# Patient Record
Sex: FEMALE | Race: BLACK OR AFRICAN AMERICAN | NOT HISPANIC OR LATINO | URBAN - METROPOLITAN AREA
[De-identification: names, ages, dates, MRNs, and addresses within clinical notes are randomized per-mention and may not be internally consistent; named-entity substitution may affect disease eponyms.]

---

## 2017-12-31 ENCOUNTER — INPATIENT (INPATIENT)
Facility: HOSPITAL | Age: 32
LOS: 1 days | Discharge: HOME | End: 2018-01-02
Attending: INTERNAL MEDICINE | Admitting: INTERNAL MEDICINE
Payer: MEDICAID

## 2017-12-31 VITALS
HEIGHT: 66 IN | HEART RATE: 120 BPM | SYSTOLIC BLOOD PRESSURE: 150 MMHG | WEIGHT: 293 LBS | DIASTOLIC BLOOD PRESSURE: 79 MMHG | TEMPERATURE: 98 F | RESPIRATION RATE: 99 BRPM

## 2017-12-31 DIAGNOSIS — E28.2 POLYCYSTIC OVARIAN SYNDROME: ICD-10-CM

## 2017-12-31 DIAGNOSIS — N93.9 ABNORMAL UTERINE AND VAGINAL BLEEDING, UNSPECIFIED: ICD-10-CM

## 2017-12-31 DIAGNOSIS — D50.9 IRON DEFICIENCY ANEMIA, UNSPECIFIED: ICD-10-CM

## 2017-12-31 LAB
ABO RH CONFIRMATION: SIGNIFICANT CHANGE UP
ALBUMIN SERPL ELPH-MCNC: 2.8 G/DL — LOW (ref 3.3–5)
ALP SERPL-CCNC: 50 U/L — SIGNIFICANT CHANGE UP (ref 40–120)
ALT FLD-CCNC: 56 U/L — SIGNIFICANT CHANGE UP (ref 12–78)
ANION GAP SERPL CALC-SCNC: 7 MMOL/L — SIGNIFICANT CHANGE UP (ref 5–17)
ANISOCYTOSIS BLD QL: SLIGHT — SIGNIFICANT CHANGE UP
APTT BLD: 26.6 SEC — LOW (ref 27.5–37.4)
AST SERPL-CCNC: 21 U/L — SIGNIFICANT CHANGE UP (ref 15–37)
BILIRUB SERPL-MCNC: 0.1 MG/DL — LOW (ref 0.2–1.2)
BLD GP AB SCN SERPL QL: SIGNIFICANT CHANGE UP
BUN SERPL-MCNC: 8 MG/DL — SIGNIFICANT CHANGE UP (ref 7–23)
CALCIUM SERPL-MCNC: 8.1 MG/DL — LOW (ref 8.5–10.1)
CHLORIDE SERPL-SCNC: 107 MMOL/L — SIGNIFICANT CHANGE UP (ref 96–108)
CO2 SERPL-SCNC: 26 MMOL/L — SIGNIFICANT CHANGE UP (ref 22–31)
CREAT SERPL-MCNC: 0.65 MG/DL — SIGNIFICANT CHANGE UP (ref 0.5–1.3)
EOSINOPHIL NFR BLD AUTO: 3 % — SIGNIFICANT CHANGE UP (ref 0–6)
GLUCOSE SERPL-MCNC: 89 MG/DL — SIGNIFICANT CHANGE UP (ref 70–99)
HCG SERPL-ACNC: <1 MIU/ML — SIGNIFICANT CHANGE UP
HCT VFR BLD CALC: 17.5 % — CRITICAL LOW (ref 34.5–45)
HCT VFR BLD CALC: 23.2 % — LOW (ref 34.5–45)
HGB BLD-MCNC: 5.8 G/DL — CRITICAL LOW (ref 11.5–15.5)
HGB BLD-MCNC: 7.8 G/DL — LOW (ref 11.5–15.5)
HYPOCHROMIA BLD QL: SIGNIFICANT CHANGE UP
INR BLD: 0.97 RATIO — SIGNIFICANT CHANGE UP (ref 0.88–1.16)
LYMPHOCYTES # BLD AUTO: 22 % — SIGNIFICANT CHANGE UP (ref 13–44)
MAGNESIUM SERPL-MCNC: 2.1 MG/DL — SIGNIFICANT CHANGE UP (ref 1.6–2.6)
MCHC RBC-ENTMCNC: 28.1 PG — SIGNIFICANT CHANGE UP (ref 27–34)
MCHC RBC-ENTMCNC: 28.5 PG — SIGNIFICANT CHANGE UP (ref 27–34)
MCHC RBC-ENTMCNC: 33.2 GM/DL — SIGNIFICANT CHANGE UP (ref 32–36)
MCHC RBC-ENTMCNC: 33.8 GM/DL — SIGNIFICANT CHANGE UP (ref 32–36)
MCV RBC AUTO: 84.2 FL — SIGNIFICANT CHANGE UP (ref 80–100)
MCV RBC AUTO: 84.5 FL — SIGNIFICANT CHANGE UP (ref 80–100)
MICROCYTES BLD QL: SIGNIFICANT CHANGE UP
MONOCYTES NFR BLD AUTO: 6 % — SIGNIFICANT CHANGE UP (ref 2–14)
NEUTROPHILS NFR BLD AUTO: 69 % — SIGNIFICANT CHANGE UP (ref 43–77)
PLAT MORPH BLD: NORMAL — SIGNIFICANT CHANGE UP
PLATELET # BLD AUTO: 353 K/UL — SIGNIFICANT CHANGE UP (ref 150–400)
PLATELET # BLD AUTO: 387 K/UL — SIGNIFICANT CHANGE UP (ref 150–400)
POLYCHROMASIA BLD QL SMEAR: SLIGHT — SIGNIFICANT CHANGE UP
POTASSIUM SERPL-MCNC: 4.1 MMOL/L — SIGNIFICANT CHANGE UP (ref 3.5–5.3)
POTASSIUM SERPL-SCNC: 4.1 MMOL/L — SIGNIFICANT CHANGE UP (ref 3.5–5.3)
PROT SERPL-MCNC: 7 GM/DL — SIGNIFICANT CHANGE UP (ref 6–8.3)
PROTHROM AB SERPL-ACNC: 10.6 SEC — SIGNIFICANT CHANGE UP (ref 9.8–12.7)
RBC # BLD: 2.07 M/UL — LOW (ref 3.8–5.2)
RBC # BLD: 2.76 M/UL — LOW (ref 3.8–5.2)
RBC # FLD: 16 % — HIGH (ref 11–15)
RBC # FLD: 19.1 % — HIGH (ref 11–15)
RBC BLD AUTO: ABNORMAL
SODIUM SERPL-SCNC: 140 MMOL/L — SIGNIFICANT CHANGE UP (ref 135–145)
WBC # BLD: 12.2 K/UL — HIGH (ref 3.8–10.5)
WBC # BLD: 15 K/UL — HIGH (ref 3.8–10.5)
WBC # FLD AUTO: 12.2 K/UL — HIGH (ref 3.8–10.5)
WBC # FLD AUTO: 15 K/UL — HIGH (ref 3.8–10.5)

## 2017-12-31 PROCEDURE — 99291 CRITICAL CARE FIRST HOUR: CPT

## 2017-12-31 RX ORDER — SODIUM CHLORIDE 9 MG/ML
1000 INJECTION INTRAMUSCULAR; INTRAVENOUS; SUBCUTANEOUS ONCE
Qty: 0 | Refills: 0 | Status: COMPLETED | OUTPATIENT
Start: 2017-12-31 | End: 2017-12-31

## 2017-12-31 RX ORDER — MULTIVIT-MIN/FERROUS GLUCONATE 9 MG/15 ML
1 LIQUID (ML) ORAL DAILY
Qty: 0 | Refills: 0 | Status: DISCONTINUED | OUTPATIENT
Start: 2017-12-31 | End: 2018-01-02

## 2017-12-31 RX ORDER — METFORMIN HYDROCHLORIDE 850 MG/1
750 TABLET ORAL DAILY
Qty: 0 | Refills: 0 | Status: DISCONTINUED | OUTPATIENT
Start: 2017-12-31 | End: 2017-12-31

## 2017-12-31 RX ORDER — KETOROLAC TROMETHAMINE 30 MG/ML
30 SYRINGE (ML) INJECTION ONCE
Qty: 0 | Refills: 0 | Status: DISCONTINUED | OUTPATIENT
Start: 2017-12-31 | End: 2017-12-31

## 2017-12-31 RX ORDER — MULTIVIT-MIN/FERROUS GLUCONATE 9 MG/15 ML
1 LIQUID (ML) ORAL
Qty: 0 | Refills: 0 | COMMUNITY

## 2017-12-31 RX ORDER — ESTROGENS, CONJUGATED 0.625 MG
25 TABLET ORAL ONCE
Qty: 0 | Refills: 0 | Status: COMPLETED | OUTPATIENT
Start: 2017-12-31 | End: 2017-12-31

## 2017-12-31 RX ORDER — MEDROXYPROGESTERONE ACETATE 150 MG/ML
10 INJECTION, SUSPENSION, EXTENDED RELEASE INTRAMUSCULAR EVERY 12 HOURS
Qty: 0 | Refills: 0 | Status: COMPLETED | OUTPATIENT
Start: 2017-12-31 | End: 2018-01-01

## 2017-12-31 RX ORDER — METFORMIN HYDROCHLORIDE 850 MG/1
1 TABLET ORAL
Qty: 0 | Refills: 0 | COMMUNITY

## 2017-12-31 RX ORDER — SODIUM CHLORIDE 9 MG/ML
1000 INJECTION INTRAMUSCULAR; INTRAVENOUS; SUBCUTANEOUS
Qty: 0 | Refills: 0 | Status: DISCONTINUED | OUTPATIENT
Start: 2017-12-31 | End: 2018-01-02

## 2017-12-31 RX ORDER — BENZOYL PEROXIDE MICRONIZED 5.8 %
1 TOWELETTE (EA) TOPICAL
Qty: 0 | Refills: 0 | COMMUNITY

## 2017-12-31 RX ORDER — METFORMIN HYDROCHLORIDE 850 MG/1
500 TABLET ORAL
Qty: 0 | Refills: 0 | Status: DISCONTINUED | OUTPATIENT
Start: 2017-12-31 | End: 2018-01-02

## 2017-12-31 RX ADMIN — SODIUM CHLORIDE 100 MILLILITER(S): 9 INJECTION INTRAMUSCULAR; INTRAVENOUS; SUBCUTANEOUS at 19:14

## 2017-12-31 RX ADMIN — METFORMIN HYDROCHLORIDE 500 MILLIGRAM(S): 850 TABLET ORAL at 17:59

## 2017-12-31 RX ADMIN — Medication 30 MILLIGRAM(S): at 15:10

## 2017-12-31 RX ADMIN — Medication 25 MILLIGRAM(S): at 13:51

## 2017-12-31 RX ADMIN — Medication 30 MILLIGRAM(S): at 14:56

## 2017-12-31 RX ADMIN — MEDROXYPROGESTERONE ACETATE 10 MILLIGRAM(S): 150 INJECTION, SUSPENSION, EXTENDED RELEASE INTRAMUSCULAR at 14:30

## 2017-12-31 RX ADMIN — SODIUM CHLORIDE 2000 MILLILITER(S): 9 INJECTION INTRAMUSCULAR; INTRAVENOUS; SUBCUTANEOUS at 18:55

## 2017-12-31 NOTE — ED PROVIDER NOTE - OBJECTIVE STATEMENT
Pertinent PMH/PSH/FHx/SHx and Review of Systems contained within:  32F hx of obesity and pcos pw vaginal bleeding associated with weakness, dizziness and exertional dyspnea. patient started xulane contraceptive patch 3 weeks ago and since then has had vaginal bleeding requiring 4 pads daily. she took the patch off on dec 26 and has not put it back on for fear of continued bleeding. no nausea, vomiting, fever, cp. denies preg  Fh and Sh not otherwise contributory  ROS otherwise negative

## 2017-12-31 NOTE — ED PROVIDER NOTE - MEDICAL DECISION MAKING DETAILS
patient pw abnormal uterine bleeding, likely owing to the patch and her pcos. patient pw abnormal uterine bleeding, likely owing to the patch and her pcos. will treat with estrogen given she is low risk for pe and start on provera. will tranfuse stat given her dangerously low hgb. dr fuentes aware of case. admit. patient pw abnormal uterine bleeding, likely owing to the patch and her pcos. will treat with estrogen given she is low risk for pe and start on provera. will tranfuse stat given her dangerously low hgb. dr fuentes aware of case. admit. As interpreted by ED physician, ECG is NSR with normal intervals/axis, no changes in QRS, no ST/T changes.

## 2017-12-31 NOTE — PHARMACY COMMUNICATION NOTE - COMMENTS
s/w dr falcon - notified we do not carry metformin er 750mg, so dr falcon wants to change to metformin IR 500mg BID; recommended to dr falcon to convert to same total daily dose however dr falcon wants pt to start metformin 500mg BID
12/31/17 spoke w Dr Young    He verified that he wants ketorolac given as ordered

## 2017-12-31 NOTE — CONSULT NOTE ADULT - SUBJECTIVE AND OBJECTIVE BOX
Patient is a 32 year old female PMH PCOS with h/o irregular menses who presents to ED with a chief complaint of heavy vaginal bleeding. Patient notes that bleeding started on 12/17, after starting a new contraceptive patch, Xulane on 12/10. Patient reports soaking through 4 pads in 12 hours for 2 weeks. Admits to passing several clots starting on 12/22, which prompted her to remove her most recent Xulane patch. Admits to associated lightheadedness, palpitations, and shortness of breath; worse when standing up from sitting position. Denies abdominal pain and lower abdominal cramping. Denies nausea, vomiting, fevers, and chills, Denies  symptoms such as dysuria, hematuria, back pain, and urinary frequency. Admits to flatus. Patient states that she was diagnosed with Chlamydia at the beginning of December, for which she was seen and treated by her GYN, (Dr. Liao) and has not been sexually active since.     Patient has received 2uPRBC and 1 dose Premarin since arrival in ED. Reports improvement in shortness of breath, but admits to persistent "heart racing" and lightheadedness when standing up.    PMH: PCOS     PSH: Denied    Social Hx: Former smoker, smoked cigarettes for 2 years when she was 20-21 years old. Admits to ETOH use socially. Denies illicit drug use.     Allergies: Denied    Meds: Metformin. (Stopped xulane on 12/26)    ROS: Remarkable for heavy vaginal bleeding, shortness of breath, palpitations, lightheadedness    Vital Signs Last 24 Hrs  T(C): 36.9 (31 Dec 2017 16:50), Max: 37.1 (31 Dec 2017 16:35)  T(F): 98.5 (31 Dec 2017 16:50), Max: 98.8 (31 Dec 2017 16:35)  HR: 95 (31 Dec 2017 16:50) (95 - 120)  BP: 101/46 (31 Dec 2017 16:50) (101/46 - 150/79)  RR: 20 (31 Dec 2017 16:50) (16 - 99)  SpO2: 100% (31 Dec 2017 16:02) (100% - 100%)    Physical Exam:  General:  Appears stated age, well-groomed, well-nourished, no distress  Eyes : Conjunctiva pale   HENT: Supple  Chest: Clear to auscultation b/l, no wheezes/rhonchi/rales. Respirations nonlabored  Cardiovascular: S1S2, tachycardic to 110  Abdomen: + BS throughout. Soft, obese, nondistended, nontender  Extremities: Palms pale. Gait & station: No calf tenderness b/l, no pedal edema b/l   Skin: No rash  Neuro/Psych: Alert, oriented    LABS:                        5.8    12.2  )-----------( 387      ( 31 Dec 2017 11:28 )             17.5     12-31    140  |  107  |  8   ----------------------------<  89  4.1   |  26  |  0.65    Ca    8.1<L>      31 Dec 2017 11:28  Mg     2.1     12-31    TPro  7.0  /  Alb  2.8<L>  /  TBili  0.1<L>  /  DBili  x   /  AST  21  /  ALT  56  /  AlkPhos  50  12-31    PT/INR - ( 31 Dec 2017 11:28 )   PT: 10.6 sec;   INR: 0.97 ratio       PTT - ( 31 Dec 2017 11:28 )  PTT:26.6 sec    EKG seen in ED: NSR

## 2017-12-31 NOTE — H&P ADULT - HISTORY OF PRESENT ILLNESS
32F hx of obesity and pcos pw vaginal bleeding associated with weakness, dizziness and exertional dyspnea. patient started xulane contraceptive patch 3 weeks ago and since then has had vaginal bleeding requiring 4 pads daily. she took the patch off on Dec 26

## 2017-12-31 NOTE — ED PROVIDER NOTE - PHYSICAL EXAMINATION
Gen: Alert, NAD  Head: NC, AT   Eyes: PERRL, EOMI, normal lids/conjunctiva  ENT: normal hearing, patent oropharynx without erythema/exudate, uvula midline  Neck: supple, no tenderness, Trachea midline  Pulm: Bilateral BS, normal resp effort, no wheeze/stridor/retractions  CV: RRR, no M/R/G, 2+ radial and dp pulses bl, no edema  Abd: soft, NT/ND, +BS, no hepatosplenomegaly  Mskel: extremities x4 with normal ROM and no joint effusions. no ctl spine ttp.   Skin: no rash, no bruising   Neuro: AAOx3, no sensory/motor deficits, CN 2-12 intact  : menstrual blood

## 2017-12-31 NOTE — ED ADULT TRIAGE NOTE - CHIEF COMPLAINT QUOTE
"bleeding heavily for the past 3 weeks" Reports having dark red heavy bleeding with clots using 3-4 pads a day. Complaint of dizziness and headache which worsens with activity. LMP: 12/10/17, started to use birth control patch first time 12/10/17. Took off patch on 12/26/17 which bleeding then lessened. Denies pain, discomfort, fever, chills.

## 2017-12-31 NOTE — ED ADULT NURSE NOTE - OBJECTIVE STATEMENT
assumed care of pt . pt c/o of heavy vaginal bleeding for 3 weeks and passing medium sized clots, dizziness, and feels " light headed". pt stated this is a going problem since she was 15 years old. hx of anemia. When walking pt c/o of headache. assumed care of pt . pt c/o of heavy vaginal bleeding for 3 weeks and passing medium sized clots, dizziness, and feels " light headed". pt stated this is a going problem since she was 15 years old. hx of anemia. When walking pt c/o of headache. hx of HPV

## 2017-12-31 NOTE — H&P ADULT - NSHPLABSRESULTS_GEN_ALL_CORE
5.8    12.2  )-----------( 387      ( 31 Dec 2017 11:28 )             17.5     12-31    140  |  107  |  8   ----------------------------<  89  4.1   |  26  |  0.65    Ca    8.1<L>      31 Dec 2017 11:28  Mg     2.1     12-31    TPro  7.0  /  Alb  2.8<L>  /  TBili  0.1<L>  /  DBili  x   /  AST  21  /  ALT  56  /  AlkPhos  50  12-31    PT/INR - ( 31 Dec 2017 11:28 )   PT: 10.6 sec;   INR: 0.97 ratio         PTT - ( 31 Dec 2017 11:28 )  PTT:26.6 sec

## 2017-12-31 NOTE — CONSULT NOTE ADULT - ASSESSMENT
Impression: 32 year old female PMH PCOS admitted with abnormal uterine bleeding  Plan:  - F/u post-transfusion CBC  - 2 more units PRBC ordered  - Serial CBCs ordered   - STAT NS bolus  - IV hydration  - F/u pelvic US  - Continue Provera as ordered  - Pad count  - Tele monitoring, monitor vitals q4h  - Continue management per medicine  - Discussed with Dr. Longoria and Dr. Walden

## 2018-01-01 DIAGNOSIS — D50.0 IRON DEFICIENCY ANEMIA SECONDARY TO BLOOD LOSS (CHRONIC): ICD-10-CM

## 2018-01-01 LAB
ANION GAP SERPL CALC-SCNC: 8 MMOL/L — SIGNIFICANT CHANGE UP (ref 5–17)
BUN SERPL-MCNC: 8 MG/DL — SIGNIFICANT CHANGE UP (ref 7–23)
CALCIUM SERPL-MCNC: 7.8 MG/DL — LOW (ref 8.5–10.1)
CHLORIDE SERPL-SCNC: 109 MMOL/L — HIGH (ref 96–108)
CO2 SERPL-SCNC: 25 MMOL/L — SIGNIFICANT CHANGE UP (ref 22–31)
CREAT SERPL-MCNC: 0.69 MG/DL — SIGNIFICANT CHANGE UP (ref 0.5–1.3)
GLUCOSE SERPL-MCNC: 126 MG/DL — HIGH (ref 70–99)
HCT VFR BLD CALC: 25.9 % — LOW (ref 34.5–45)
HCT VFR BLD CALC: 28.9 % — LOW (ref 34.5–45)
HCT VFR BLD CALC: 28.9 % — LOW (ref 34.5–45)
HGB BLD-MCNC: 8.9 G/DL — LOW (ref 11.5–15.5)
HGB BLD-MCNC: 9.9 G/DL — LOW (ref 11.5–15.5)
HGB BLD-MCNC: 9.9 G/DL — LOW (ref 11.5–15.5)
MCHC RBC-ENTMCNC: 28.9 PG — SIGNIFICANT CHANGE UP (ref 27–34)
MCHC RBC-ENTMCNC: 29 PG — SIGNIFICANT CHANGE UP (ref 27–34)
MCHC RBC-ENTMCNC: 29 PG — SIGNIFICANT CHANGE UP (ref 27–34)
MCHC RBC-ENTMCNC: 34.2 GM/DL — SIGNIFICANT CHANGE UP (ref 32–36)
MCHC RBC-ENTMCNC: 34.2 GM/DL — SIGNIFICANT CHANGE UP (ref 32–36)
MCHC RBC-ENTMCNC: 34.3 GM/DL — SIGNIFICANT CHANGE UP (ref 32–36)
MCV RBC AUTO: 84.4 FL — SIGNIFICANT CHANGE UP (ref 80–100)
MCV RBC AUTO: 84.7 FL — SIGNIFICANT CHANGE UP (ref 80–100)
MCV RBC AUTO: 84.8 FL — SIGNIFICANT CHANGE UP (ref 80–100)
PLATELET # BLD AUTO: 316 K/UL — SIGNIFICANT CHANGE UP (ref 150–400)
PLATELET # BLD AUTO: 348 K/UL — SIGNIFICANT CHANGE UP (ref 150–400)
PLATELET # BLD AUTO: 371 K/UL — SIGNIFICANT CHANGE UP (ref 150–400)
POTASSIUM SERPL-MCNC: 3.9 MMOL/L — SIGNIFICANT CHANGE UP (ref 3.5–5.3)
POTASSIUM SERPL-SCNC: 3.9 MMOL/L — SIGNIFICANT CHANGE UP (ref 3.5–5.3)
RBC # BLD: 3.06 M/UL — LOW (ref 3.8–5.2)
RBC # BLD: 3.4 M/UL — LOW (ref 3.8–5.2)
RBC # BLD: 3.42 M/UL — LOW (ref 3.8–5.2)
RBC # FLD: 15.3 % — HIGH (ref 11–15)
RBC # FLD: 15.6 % — HIGH (ref 11–15)
RBC # FLD: 15.7 % — HIGH (ref 11–15)
SODIUM SERPL-SCNC: 142 MMOL/L — SIGNIFICANT CHANGE UP (ref 135–145)
WBC # BLD: 12.2 K/UL — HIGH (ref 3.8–10.5)
WBC # BLD: 12.6 K/UL — HIGH (ref 3.8–10.5)
WBC # BLD: 13 K/UL — HIGH (ref 3.8–10.5)
WBC # FLD AUTO: 12.2 K/UL — HIGH (ref 3.8–10.5)
WBC # FLD AUTO: 12.6 K/UL — HIGH (ref 3.8–10.5)
WBC # FLD AUTO: 13 K/UL — HIGH (ref 3.8–10.5)

## 2018-01-01 RX ORDER — ASCORBIC ACID 60 MG
500 TABLET,CHEWABLE ORAL DAILY
Qty: 0 | Refills: 0 | Status: DISCONTINUED | OUTPATIENT
Start: 2018-01-01 | End: 2018-01-02

## 2018-01-01 RX ORDER — FERROUS SULFATE 325(65) MG
325 TABLET ORAL
Qty: 0 | Refills: 0 | Status: DISCONTINUED | OUTPATIENT
Start: 2018-01-01 | End: 2018-01-02

## 2018-01-01 RX ORDER — FOLIC ACID 0.8 MG
1 TABLET ORAL DAILY
Qty: 0 | Refills: 0 | Status: DISCONTINUED | OUTPATIENT
Start: 2018-01-01 | End: 2018-01-02

## 2018-01-01 RX ORDER — MEDROXYPROGESTERONE ACETATE 150 MG/ML
10 INJECTION, SUSPENSION, EXTENDED RELEASE INTRAMUSCULAR DAILY
Qty: 0 | Refills: 0 | Status: DISCONTINUED | OUTPATIENT
Start: 2018-01-02 | End: 2018-01-02

## 2018-01-01 RX ADMIN — METFORMIN HYDROCHLORIDE 500 MILLIGRAM(S): 850 TABLET ORAL at 17:32

## 2018-01-01 RX ADMIN — Medication 325 MILLIGRAM(S): at 17:33

## 2018-01-01 RX ADMIN — Medication 1 TABLET(S): at 12:15

## 2018-01-01 RX ADMIN — METFORMIN HYDROCHLORIDE 500 MILLIGRAM(S): 850 TABLET ORAL at 07:52

## 2018-01-01 RX ADMIN — MEDROXYPROGESTERONE ACETATE 10 MILLIGRAM(S): 150 INJECTION, SUSPENSION, EXTENDED RELEASE INTRAMUSCULAR at 17:32

## 2018-01-01 RX ADMIN — MEDROXYPROGESTERONE ACETATE 10 MILLIGRAM(S): 150 INJECTION, SUSPENSION, EXTENDED RELEASE INTRAMUSCULAR at 06:59

## 2018-01-01 NOTE — PROGRESS NOTE ADULT - ATTENDING COMMENTS
Explained to patient that in view of her excess BMI, Transdermal patch is not acceptable therapy for hormonal imbalance.  Patient does not want to switch to a mid-dose combination OCP.

## 2018-01-01 NOTE — PROGRESS NOTE ADULT - PROBLEM SELECTOR PLAN 2
cont metformin
Susided  Continue PO Provera for 2 weeks until patent seen by own GYN  F/U Pelvic Sono

## 2018-01-02 ENCOUNTER — TRANSCRIPTION ENCOUNTER (OUTPATIENT)
Age: 33
End: 2018-01-02

## 2018-01-02 VITALS
TEMPERATURE: 96 F | DIASTOLIC BLOOD PRESSURE: 55 MMHG | HEART RATE: 97 BPM | OXYGEN SATURATION: 100 % | RESPIRATION RATE: 17 BRPM | SYSTOLIC BLOOD PRESSURE: 108 MMHG

## 2018-01-02 LAB
ANION GAP SERPL CALC-SCNC: 10 MMOL/L — SIGNIFICANT CHANGE UP (ref 5–17)
BUN SERPL-MCNC: 10 MG/DL — SIGNIFICANT CHANGE UP (ref 7–23)
CALCIUM SERPL-MCNC: 8.1 MG/DL — LOW (ref 8.5–10.1)
CHLORIDE SERPL-SCNC: 108 MMOL/L — SIGNIFICANT CHANGE UP (ref 96–108)
CO2 SERPL-SCNC: 23 MMOL/L — SIGNIFICANT CHANGE UP (ref 22–31)
CREAT SERPL-MCNC: 0.63 MG/DL — SIGNIFICANT CHANGE UP (ref 0.5–1.3)
GLUCOSE SERPL-MCNC: 103 MG/DL — HIGH (ref 70–99)
HCT VFR BLD CALC: 28.5 % — LOW (ref 34.5–45)
HGB BLD-MCNC: 9.9 G/DL — LOW (ref 11.5–15.5)
MCHC RBC-ENTMCNC: 30.4 PG — SIGNIFICANT CHANGE UP (ref 27–34)
MCHC RBC-ENTMCNC: 34.9 GM/DL — SIGNIFICANT CHANGE UP (ref 32–36)
MCV RBC AUTO: 87.2 FL — SIGNIFICANT CHANGE UP (ref 80–100)
PLATELET # BLD AUTO: 330 K/UL — SIGNIFICANT CHANGE UP (ref 150–400)
POTASSIUM SERPL-MCNC: 4 MMOL/L — SIGNIFICANT CHANGE UP (ref 3.5–5.3)
POTASSIUM SERPL-SCNC: 4 MMOL/L — SIGNIFICANT CHANGE UP (ref 3.5–5.3)
RBC # BLD: 3.26 M/UL — LOW (ref 3.8–5.2)
RBC # FLD: 17.1 % — HIGH (ref 11–15)
SODIUM SERPL-SCNC: 141 MMOL/L — SIGNIFICANT CHANGE UP (ref 135–145)
WBC # BLD: 12.2 K/UL — HIGH (ref 3.8–10.5)
WBC # FLD AUTO: 12.2 K/UL — HIGH (ref 3.8–10.5)

## 2018-01-02 PROCEDURE — 76856 US EXAM PELVIC COMPLETE: CPT | Mod: 26

## 2018-01-02 RX ORDER — ASCORBIC ACID 60 MG
1 TABLET,CHEWABLE ORAL
Qty: 30 | Refills: 0 | OUTPATIENT
Start: 2018-01-02 | End: 2018-01-31

## 2018-01-02 RX ORDER — FOLIC ACID 0.8 MG
1 TABLET ORAL
Qty: 30 | Refills: 0 | OUTPATIENT
Start: 2018-01-02 | End: 2018-01-31

## 2018-01-02 RX ORDER — MEDROXYPROGESTERONE ACETATE 150 MG/ML
1 INJECTION, SUSPENSION, EXTENDED RELEASE INTRAMUSCULAR
Qty: 10 | Refills: 0 | OUTPATIENT
Start: 2018-01-02 | End: 2018-01-11

## 2018-01-02 RX ORDER — DOCUSATE SODIUM 100 MG
100 CAPSULE ORAL
Qty: 0 | Refills: 0 | Status: DISCONTINUED | OUTPATIENT
Start: 2018-01-02 | End: 2018-01-02

## 2018-01-02 RX ADMIN — Medication 1 MILLIGRAM(S): at 12:24

## 2018-01-02 RX ADMIN — Medication 100 MILLIGRAM(S): at 06:39

## 2018-01-02 RX ADMIN — Medication 100 MILLIGRAM(S): at 00:43

## 2018-01-02 RX ADMIN — Medication 325 MILLIGRAM(S): at 08:15

## 2018-01-02 RX ADMIN — METFORMIN HYDROCHLORIDE 500 MILLIGRAM(S): 850 TABLET ORAL at 08:15

## 2018-01-02 RX ADMIN — MEDROXYPROGESTERONE ACETATE 10 MILLIGRAM(S): 150 INJECTION, SUSPENSION, EXTENDED RELEASE INTRAMUSCULAR at 12:25

## 2018-01-02 RX ADMIN — Medication 1 TABLET(S): at 12:25

## 2018-01-02 RX ADMIN — Medication 500 MILLIGRAM(S): at 12:24

## 2018-01-02 NOTE — PROGRESS NOTE ADULT - SUBJECTIVE AND OBJECTIVE BOX
Patient is a 32y old  Female who presents with a chief complaint of bleeding (31 Dec 2017 17:00)      INTERVAL HPI/OVERNIGHT EVENTS:  pt says the bleeding is getting better  MEDICATIONS  (STANDING):  ascorbic acid 500 milliGRAM(s) Oral daily  ferrous    sulfate 325 milliGRAM(s) Oral two times a day with meals  folic acid 1 milliGRAM(s) Oral daily  medroxyPROGESTERone 10 milliGRAM(s) Oral every 12 hours  metFORMIN 500 milliGRAM(s) Oral two times a day with meals  multivitamin/minerals 1 Tablet(s) Oral daily  sodium chloride 0.9%. 1000 milliLiter(s) (100 mL/Hr) IV Continuous <Continuous>    MEDICATIONS  (PRN):      Allergies    No Known Allergies    Intolerances        REVIEW OF SYSTEMS:  CONSTITUTIONAL: No fever, weight loss, or fatigue  EYES: No eye pain, visual disturbances, or discharge  ENMT:  No difficulty hearing, tinnitus, vertigo; No sinus or throat pain  NECK: No pain or stiffness  BREASTS: No pain, masses, or nipple discharge  RESPIRATORY: No cough, wheezing, chills or hemoptysis; No shortness of breath  CARDIOVASCULAR: No chest pain, palpitations, dizziness, or leg swelling  GASTROINTESTINAL: No abdominal or epigastric pain. No nausea, vomiting, or hematemesis; No diarrhea or constipation. No melena or hematochezia.  GENITOURINARY: No dysuria, frequency, hematuria, or incontinence  NEUROLOGICAL: No headaches, memory loss, loss of strength, numbness, or tremors  SKIN: No itching, burning, rashes, or lesions   LYMPH NODES: No enlarged glands  ENDOCRINE: No heat or cold intolerance; No hair loss  MUSCULOSKELETAL: No joint pain or swelling; No muscle, back, or extremity pain  PSYCHIATRIC: No depression, anxiety, mood swings, or difficulty sleeping  HEME/LYMPH: No easy bruising, or bleeding gums  ALLERGY AND IMMUNOLOGIC: No hives or eczema    Vital Signs Last 24 Hrs  T(C): 36.7 (01 Jan 2018 05:44), Max: 37.1 (31 Dec 2017 16:35)  T(F): 98 (01 Jan 2018 05:44), Max: 98.8 (31 Dec 2017 16:35)  HR: 100 (01 Jan 2018 05:44) (95 - 110)  BP: 100/65 (01 Jan 2018 05:44) (100/65 - 129/59)  BP(mean): --  RR: 18 (01 Jan 2018 05:44) (16 - 20)  SpO2: 100% (01 Jan 2018 05:44) (100% - 100%)    PHYSICAL EXAM:  GENERAL: NAD, well-groomed, well-developed  HEAD:  Atraumatic, Normocephalic  EYES: EOMI, PERRLA, conjunctiva and sclera clear  ENMT: No tonsillar erythema, exudates, or enlargement; Moist mucous membranes, Good dentition, No lesions  NECK: Supple, No JVD, Normal thyroid  NERVOUS SYSTEM:  Alert & Oriented X3, Good concentration; Motor Strength 5/5 B/L upper and lower extremities; DTRs 2+ intact and symmetric  CHEST/LUNG: Clear to percussion bilaterally; No rales, rhonchi, wheezing, or rubs  HEART: Regular rate and rhythm; No murmurs, rubs, or gallops  ABDOMEN: Soft, Nontender, Nondistended; Bowel sounds present  EXTREMITIES:  2+ Peripheral Pulses, No clubbing, cyanosis, or edema  LYMPH: No lymphadenopathy noted  SKIN: No rashes or lesions    LABS:                        9.9    12.2  )-----------( 348      ( 01 Jan 2018 11:42 )             28.9     01-01    142  |  109<H>  |  8   ----------------------------<  126<H>  3.9   |  25  |  0.69    Ca    7.8<L>      01 Jan 2018 08:00  Mg     2.1     12-31    TPro  7.0  /  Alb  2.8<L>  /  TBili  0.1<L>  /  DBili  x   /  AST  21  /  ALT  56  /  AlkPhos  50  12-31    PT/INR - ( 31 Dec 2017 11:28 )   PT: 10.6 sec;   INR: 0.97 ratio         PTT - ( 31 Dec 2017 11:28 )  PTT:26.6 sec    CAPILLARY BLOOD GLUCOSE        CULTURES:    HEMOGLOBIN A1C:    CHOLESTEROL:        RADIOLOGY & ADDITIONAL TESTS:
Patient seen and evaluate at bedside, Reports bleeding has much sunsided but continues lightly. Does not feel as fatigued and not dizzy anymore.  now S/P 4 units of PRBCs.  VSS Afebrile  Sono: pending
SURGERY PROGRESS HPI:  Pt seen and examined at bedside. Denies pain and complaints. States that the vaginal bleeding has stopped. Pt tolerating diet. Pt denies nausea and vomiting.  +BM/flatus. Voiding well. Pt denies chest pain, SOB, dizziness, fever, chills. Ambulating.      Vital Signs Last 24 Hrs  T(C): 36.6 (02 Jan 2018 05:23), Max: 37 (01 Jan 2018 18:02)  T(F): 97.8 (02 Jan 2018 05:23), Max: 98.6 (01 Jan 2018 18:02)  HR: 90 (02 Jan 2018 05:23) (90 - 101)  BP: 115/63 (02 Jan 2018 05:23) (115/63 - 125/68)  BP(mean): --  RR: 17 (02 Jan 2018 05:23) (17 - 18)  SpO2: 98% (02 Jan 2018 05:23) (97% - 100%)      PHYSICAL EXAM:    GENERAL: NAD  HEAD:  Atraumatic, Normocephalic  CHEST/LUNG: Clear to ausculation, bilaterally   HEART: RRR S1S2  ABDOMEN: obese, non distended, +BS, soft, non tender, no guarding  EXTREMITIES:  calf soft, non tender b/l    I&O's Detail      LABS:                        9.9    13.0  )-----------( 371      ( 01 Jan 2018 16:40 )             28.9     01-01    142  |  109<H>  |  8   ----------------------------<  126<H>  3.9   |  25  |  0.69    Ca    7.8<L>      01 Jan 2018 08:00  Mg     2.1     12-31    TPro  7.0  /  Alb  2.8<L>  /  TBili  0.1<L>  /  DBili  x   /  AST  21  /  ALT  56  /  AlkPhos  50  12-31    PT/INR - ( 31 Dec 2017 11:28 )   PT: 10.6 sec;   INR: 0.97 ratio         PTT - ( 31 Dec 2017 11:28 )  PTT:26.6 sec      Assessment: 32 year old female PMH PCOS admitted with abnormal uterine bleeding s/p 4uPRBC. H/H stable.    Plan:  -Continue PO Provera for 2 weeks until patent seen by own GYN  -F/U Pelvic Sono  -Iron, folic acid, vitamin C  -continue DVT prophylaxis, OOB, Ambulating  -f/u AM H/H  -continue medical management   -will d/w attending

## 2018-01-02 NOTE — DISCHARGE NOTE ADULT - SECONDARY DIAGNOSIS.
Iron deficiency anemia due to chronic blood loss PCOS (polycystic ovarian syndrome) Obesities, morbid

## 2018-01-02 NOTE — DISCHARGE NOTE ADULT - MEDICATION SUMMARY - MEDICATIONS TO TAKE
I will START or STAY ON the medications listed below when I get home from the hospital:    metFORMIN 750 mg oral tablet, extended release  -- 1 tab(s) by mouth once a day  -- Indication: For PCOS (polycystic ovarian syndrome)    medroxyPROGESTERone 10 mg oral tablet  -- 1 tab(s) by mouth once a day  -- Indication: For Abnormal uterine bleeding    Geritol oral tablet  -- 1 tab(s) by mouth once a day  -- Indication: For constipation    Iron 100 Plus oral tablet  -- 1 tab(s) by mouth once a day  -- Indication: For Anemia    Daily Multi oral tablet  -- 1 tab(s) by mouth once a day  -- Indication: For Anemia    folic acid 1 mg oral tablet  -- 1 tab(s) by mouth once a day  -- Indication: For Anemia    ascorbic acid 500 mg oral tablet  -- 1 tab(s) by mouth once a day  -- Indication: For Iron deficiency anemia due to chronic blood loss

## 2018-01-02 NOTE — DISCHARGE NOTE ADULT - CARE PLAN
Principal Discharge DX:	Abnormal uterine bleeding  Goal:	follow up with gyn Dr Brambila [phone]  Instructions for follow-up, activity and diet:	follow up with PMD in 1 week  Secondary Diagnosis:	Iron deficiency anemia due to chronic blood loss  Secondary Diagnosis:	PCOS (polycystic ovarian syndrome)  Secondary Diagnosis:	Obesities, morbid

## 2018-01-02 NOTE — DISCHARGE NOTE ADULT - PATIENT PORTAL LINK FT
“You can access the Follow[facility] Patient Portal, offered by [facility], by registering with the following website: [email or web address]”

## 2018-01-04 DIAGNOSIS — E28.2 POLYCYSTIC OVARIAN SYNDROME: ICD-10-CM

## 2018-01-04 DIAGNOSIS — Z87.891 PERSONAL HISTORY OF NICOTINE DEPENDENCE: ICD-10-CM

## 2018-01-04 DIAGNOSIS — D50.0 IRON DEFICIENCY ANEMIA SECONDARY TO BLOOD LOSS (CHRONIC): ICD-10-CM

## 2018-01-04 DIAGNOSIS — N93.9 ABNORMAL UTERINE AND VAGINAL BLEEDING, UNSPECIFIED: ICD-10-CM

## 2018-01-04 DIAGNOSIS — T38.4X5A ADVERSE EFFECT OF ORAL CONTRACEPTIVES, INITIAL ENCOUNTER: ICD-10-CM

## 2018-01-04 DIAGNOSIS — D64.9 ANEMIA, UNSPECIFIED: ICD-10-CM

## 2018-01-04 DIAGNOSIS — E66.01 MORBID (SEVERE) OBESITY DUE TO EXCESS CALORIES: ICD-10-CM

## 2018-01-04 DIAGNOSIS — Z79.84 LONG TERM (CURRENT) USE OF ORAL HYPOGLYCEMIC DRUGS: ICD-10-CM

## 2020-08-24 ENCOUNTER — EMERGENCY (EMERGENCY)
Facility: HOSPITAL | Age: 35
LOS: 0 days | Discharge: HOME | End: 2020-08-24
Attending: EMERGENCY MEDICINE
Payer: MEDICAID

## 2020-08-24 VITALS
DIASTOLIC BLOOD PRESSURE: 82 MMHG | RESPIRATION RATE: 18 BRPM | SYSTOLIC BLOOD PRESSURE: 122 MMHG | HEIGHT: 67 IN | WEIGHT: 289.03 LBS | TEMPERATURE: 98 F | OXYGEN SATURATION: 98 % | HEART RATE: 87 BPM

## 2020-08-24 VITALS
DIASTOLIC BLOOD PRESSURE: 61 MMHG | HEART RATE: 85 BPM | OXYGEN SATURATION: 100 % | SYSTOLIC BLOOD PRESSURE: 132 MMHG | RESPIRATION RATE: 17 BRPM | TEMPERATURE: 98 F

## 2020-08-24 DIAGNOSIS — D50.9 IRON DEFICIENCY ANEMIA, UNSPECIFIED: ICD-10-CM

## 2020-08-24 DIAGNOSIS — E28.2 POLYCYSTIC OVARIAN SYNDROME: ICD-10-CM

## 2020-08-24 DIAGNOSIS — N93.9 ABNORMAL UTERINE AND VAGINAL BLEEDING, UNSPECIFIED: ICD-10-CM

## 2020-08-24 DIAGNOSIS — R10.30 LOWER ABDOMINAL PAIN, UNSPECIFIED: ICD-10-CM

## 2020-08-24 DIAGNOSIS — N92.0 EXCESSIVE AND FREQUENT MENSTRUATION WITH REGULAR CYCLE: ICD-10-CM

## 2020-08-24 DIAGNOSIS — Z79.84 LONG TERM (CURRENT) USE OF ORAL HYPOGLYCEMIC DRUGS: ICD-10-CM

## 2020-08-24 PROBLEM — D64.9 ANEMIA, UNSPECIFIED: Chronic | Status: ACTIVE | Noted: 2017-12-31

## 2020-08-24 LAB
ALBUMIN SERPL ELPH-MCNC: 2.8 G/DL — LOW (ref 3.3–5)
ALP SERPL-CCNC: 41 U/L — SIGNIFICANT CHANGE UP (ref 40–120)
ALT FLD-CCNC: 18 U/L — SIGNIFICANT CHANGE UP (ref 12–78)
ANION GAP SERPL CALC-SCNC: 4 MMOL/L — LOW (ref 5–17)
APPEARANCE UR: ABNORMAL
APTT BLD: 28 SEC — SIGNIFICANT CHANGE UP (ref 27.5–35.5)
AST SERPL-CCNC: 12 U/L — LOW (ref 15–37)
BACTERIA # UR AUTO: ABNORMAL
BASOPHILS # BLD AUTO: 0.02 K/UL — SIGNIFICANT CHANGE UP (ref 0–0.2)
BASOPHILS NFR BLD AUTO: 0.3 % — SIGNIFICANT CHANGE UP (ref 0–2)
BILIRUB SERPL-MCNC: 0.3 MG/DL — SIGNIFICANT CHANGE UP (ref 0.2–1.2)
BILIRUB UR-MCNC: NEGATIVE — SIGNIFICANT CHANGE UP
BLD GP AB SCN SERPL QL: SIGNIFICANT CHANGE UP
BUN SERPL-MCNC: 11 MG/DL — SIGNIFICANT CHANGE UP (ref 7–23)
CALCIUM SERPL-MCNC: 8.4 MG/DL — LOW (ref 8.5–10.1)
CHLORIDE SERPL-SCNC: 108 MMOL/L — SIGNIFICANT CHANGE UP (ref 96–108)
CK MB CFR SERPL CALC: <1 NG/ML — SIGNIFICANT CHANGE UP (ref 0.5–3.6)
CO2 SERPL-SCNC: 26 MMOL/L — SIGNIFICANT CHANGE UP (ref 22–31)
COLOR SPEC: YELLOW — SIGNIFICANT CHANGE UP
CREAT SERPL-MCNC: 0.66 MG/DL — SIGNIFICANT CHANGE UP (ref 0.5–1.3)
DIFF PNL FLD: ABNORMAL
EOSINOPHIL # BLD AUTO: 0.11 K/UL — SIGNIFICANT CHANGE UP (ref 0–0.5)
EOSINOPHIL NFR BLD AUTO: 1.5 % — SIGNIFICANT CHANGE UP (ref 0–6)
EPI CELLS # UR: SIGNIFICANT CHANGE UP
GLUCOSE SERPL-MCNC: 103 MG/DL — HIGH (ref 70–99)
GLUCOSE UR QL: NEGATIVE MG/DL — SIGNIFICANT CHANGE UP
HCG SERPL-ACNC: <1 MIU/ML — SIGNIFICANT CHANGE UP
HCT VFR BLD CALC: 27.2 % — LOW (ref 34.5–45)
HGB BLD-MCNC: 9.4 G/DL — LOW (ref 11.5–15.5)
IMM GRANULOCYTES NFR BLD AUTO: 0.3 % — SIGNIFICANT CHANGE UP (ref 0–1.5)
INR BLD: 0.97 RATIO — SIGNIFICANT CHANGE UP (ref 0.88–1.16)
KETONES UR-MCNC: NEGATIVE — SIGNIFICANT CHANGE UP
LEUKOCYTE ESTERASE UR-ACNC: NEGATIVE — SIGNIFICANT CHANGE UP
LYMPHOCYTES # BLD AUTO: 2.63 K/UL — SIGNIFICANT CHANGE UP (ref 1–3.3)
LYMPHOCYTES # BLD AUTO: 36.8 % — SIGNIFICANT CHANGE UP (ref 13–44)
MCHC RBC-ENTMCNC: 28.2 PG — SIGNIFICANT CHANGE UP (ref 27–34)
MCHC RBC-ENTMCNC: 34.6 GM/DL — SIGNIFICANT CHANGE UP (ref 32–36)
MCV RBC AUTO: 81.7 FL — SIGNIFICANT CHANGE UP (ref 80–100)
MONOCYTES # BLD AUTO: 0.57 K/UL — SIGNIFICANT CHANGE UP (ref 0–0.9)
MONOCYTES NFR BLD AUTO: 8 % — SIGNIFICANT CHANGE UP (ref 2–14)
NEUTROPHILS # BLD AUTO: 3.8 K/UL — SIGNIFICANT CHANGE UP (ref 1.8–7.4)
NEUTROPHILS NFR BLD AUTO: 53.1 % — SIGNIFICANT CHANGE UP (ref 43–77)
NITRITE UR-MCNC: NEGATIVE — SIGNIFICANT CHANGE UP
NRBC # BLD: 0 /100 WBCS — SIGNIFICANT CHANGE UP (ref 0–0)
PH UR: 6.5 — SIGNIFICANT CHANGE UP (ref 5–8)
PLATELET # BLD AUTO: 267 K/UL — SIGNIFICANT CHANGE UP (ref 150–400)
POTASSIUM SERPL-MCNC: 3.8 MMOL/L — SIGNIFICANT CHANGE UP (ref 3.5–5.3)
POTASSIUM SERPL-SCNC: 3.8 MMOL/L — SIGNIFICANT CHANGE UP (ref 3.5–5.3)
PROT SERPL-MCNC: 6.8 GM/DL — SIGNIFICANT CHANGE UP (ref 6–8.3)
PROT UR-MCNC: NEGATIVE MG/DL — SIGNIFICANT CHANGE UP
PROTHROM AB SERPL-ACNC: 11.3 SEC — SIGNIFICANT CHANGE UP (ref 10.6–13.6)
RBC # BLD: 3.33 M/UL — LOW (ref 3.8–5.2)
RBC # FLD: 13.2 % — SIGNIFICANT CHANGE UP (ref 10.3–14.5)
RBC CASTS # UR COMP ASSIST: >50 /HPF (ref 0–4)
SODIUM SERPL-SCNC: 138 MMOL/L — SIGNIFICANT CHANGE UP (ref 135–145)
SP GR SPEC: 1.01 — SIGNIFICANT CHANGE UP (ref 1.01–1.02)
TROPONIN I SERPL-MCNC: <.015 NG/ML — SIGNIFICANT CHANGE UP (ref 0.01–0.04)
UROBILINOGEN FLD QL: NEGATIVE MG/DL — SIGNIFICANT CHANGE UP
WBC # BLD: 7.15 K/UL — SIGNIFICANT CHANGE UP (ref 3.8–10.5)
WBC # FLD AUTO: 7.15 K/UL — SIGNIFICANT CHANGE UP (ref 3.8–10.5)
WBC UR QL: SIGNIFICANT CHANGE UP

## 2020-08-24 PROCEDURE — 93010 ELECTROCARDIOGRAM REPORT: CPT

## 2020-08-24 PROCEDURE — 99285 EMERGENCY DEPT VISIT HI MDM: CPT

## 2020-08-24 PROCEDURE — 76856 US EXAM PELVIC COMPLETE: CPT | Mod: 26

## 2020-08-24 RX ORDER — MEDROXYPROGESTERONE ACETATE 150 MG/ML
10 INJECTION, SUSPENSION, EXTENDED RELEASE INTRAMUSCULAR ONCE
Refills: 0 | Status: COMPLETED | OUTPATIENT
Start: 2020-08-24 | End: 2020-08-24

## 2020-08-24 RX ORDER — SODIUM CHLORIDE 9 MG/ML
1000 INJECTION INTRAMUSCULAR; INTRAVENOUS; SUBCUTANEOUS ONCE
Refills: 0 | Status: COMPLETED | OUTPATIENT
Start: 2020-08-24 | End: 2020-08-24

## 2020-08-24 RX ORDER — MEDROXYPROGESTERONE ACETATE 150 MG/ML
1 INJECTION, SUSPENSION, EXTENDED RELEASE INTRAMUSCULAR
Qty: 20 | Refills: 0
Start: 2020-08-24 | End: 2020-09-12

## 2020-08-24 RX ADMIN — SODIUM CHLORIDE 1000 MILLILITER(S): 9 INJECTION INTRAMUSCULAR; INTRAVENOUS; SUBCUTANEOUS at 07:00

## 2020-08-24 RX ADMIN — SODIUM CHLORIDE 1000 MILLILITER(S): 9 INJECTION INTRAMUSCULAR; INTRAVENOUS; SUBCUTANEOUS at 08:27

## 2020-08-24 RX ADMIN — MEDROXYPROGESTERONE ACETATE 10 MILLIGRAM(S): 150 INJECTION, SUSPENSION, EXTENDED RELEASE INTRAMUSCULAR at 08:42

## 2020-08-24 NOTE — ED PROVIDER NOTE - CLINICAL SUMMARY MEDICAL DECISION MAKING FREE TEXT BOX
35 yo F with abnormal vaginal bleeding, likely PCOS, doubt fibroids or acute gyn pathology, doubt acs, likely acute on chronic anemia   -basic labs, coags, type and screen, trop, ckmb (for cp), ua, cx, US pelvis, ekg, iv, ns hydration bolus  -f/u results, reeval

## 2020-08-24 NOTE — ED ADULT TRIAGE NOTE - CHIEF COMPLAINT QUOTE
pt c/o vaginal bleed x 3 months. pt stated she started birth control x 3 weeks ago and bleeding got heavier. pt also c/o sharp cp x 10minuts

## 2020-08-24 NOTE — ED ADULT NURSE NOTE - NSFALLRSKASSESSTYPE_ED_ALL_ED
----- Message from Lynsey Camilo MD sent at 12/20/2018 10:13 AM CST -----  Notify nasal swab is negative for MRSA. No treatment needed. Can try Hibiclens cleanser 2-3 times a week to help try to prevent recurrent boils.   Initial (On Arrival)

## 2020-08-24 NOTE — ED ADULT NURSE NOTE - NSIMPLEMENTINTERV_GEN_ALL_ED
Implemented All Universal Safety Interventions:  [facility] to call system. Call bell, personal items and telephone within reach. Instruct patient to call for assistance. Room bathroom lighting operational. Non-slip footwear when patient is off stretcher. Physically safe environment: no spills, clutter or unnecessary equipment. Stretcher in lowest position, wheels locked, appropriate side rails in place.

## 2020-08-24 NOTE — ED PROVIDER NOTE - NSFOLLOWUPINSTRUCTIONS_ED_ALL_ED_FT
PLEASE FOLLOW UP/CONTACT YOUR OBGYN IN 24 HOURS.     Follow up with your primary care doctor within the next 24-48 hours and bring copy of your results.  Return to the Emergency Department for worsening or persistent symptoms or any other concerns incl. chest pain, shortness of breath, dizziness, inability to tolerate oral intake.  Rest, drink plenty of fluids.  Advance activity as tolerated.  Continue all previously prescribed medications as directed. You can use motrin 600mg every 6-8 hours for pain or fever, and/or Tylenol 650 mg every 4 hours for pain/fever (Max 4g/day of tylenol, but try to stay under 3.5g/day.)

## 2020-08-24 NOTE — ED ADULT NURSE NOTE - OBJECTIVE STATEMENT
assisting primary RN . pt received to bed A with mother c/o vaginal bleeding starting 3 months ago. pt stated she started birth control x 3 weeks ago and bleeding got heavier, with sometimes saturating 4 pads/hr. pt also c/o sharp chest pain while on the way to the hospital. witnessed pt ambulate with steady gait with standby assistance

## 2020-08-24 NOTE — ED PROVIDER NOTE - PROGRESS NOTE DETAILS
endorsed by dr jose pending sono for menorrhagia/DUB, pt Hb/hct stable compared to previous. VSS. Sono without acute pathology. Message left for pt obgyn. pt reports last week with heavy bleeding however improved this week, ~ 5-6pads/day. advise pt to call OBGYN today. Sono without acute pathology. Message left for pt obgyn. pt reports last week with heavy bleeding however improved this week, ~ 5-6pads/day. advise pt to call OBGYN today. d/w dr fuentes who recommended provera 10mg in ER and then provera 10mg tonight. Then a rx for provera 10mg once a day at night for 20 days.

## 2020-08-24 NOTE — ED PROVIDER NOTE - PHYSICAL EXAMINATION
Vitals: WNL  Gen: AAOx3, NAD, sitting comfortably in stretcher  Head: ncat, perrla, eomi b/l  Neck: supple, no lymphadenopathy, no midline deviation  Heart: rrr, no m/r/g  Lungs: CTA b/l, no rales/ronchi/wheezes  Abd: soft, nontender, obese but non-distended, no rebound or guarding  Ext: no clubbing/cyanosis/edema  Neuro: sensation and muscle strength intact b/l, steady gait

## 2020-08-24 NOTE — ED PROVIDER NOTE - OBJECTIVE STATEMENT
35 yo F with heavy vaginal bleeding for months.  Pt. started a new birth control about 3 weeks ago prescribed by OB/GYN for PCOS related vaginal bleeding.  Pt. states it made bleeding worse and she stopped taking it about 3 days ago.  Pt. has mild lower abd cramping.  Bleeding is heavy at times, sometimes she goes through 3-4 pads per hours and is also passing medium sized clots.  Lastly pt. reports mild chest pain that started on her way to the ER, described as discomfort or pressure, but it has since resolved.  No other complaints.   ROS: negative for fever, cough, headache, chest pain, shortness of breath, nausea, vomiting, diarrhea, rash, paresthesia, and focal weakness--all other systems reviewed are negative.   PMH: PCOS, iron def anemia, transfusions in past; Meds: vit supp, iron, folate, formerly on Tri-lo-sprintec (OCP); SH: Denies smoking/drinking/drug use

## 2020-08-25 LAB
CULTURE RESULTS: SIGNIFICANT CHANGE UP
SPECIMEN SOURCE: SIGNIFICANT CHANGE UP

## 2022-02-14 NOTE — PATIENT PROFILE ADULT. - SPIRITUAL CULTURAL, RELIGIOUS PRACTICES/VALUES, PROFILE
Care Due:                  Date            Visit Type   Department     Provider  --------------------------------------------------------------------------------                                ESTABLISHED                              PATIENT -    [facility]  Last Visit: 12-      VIRTUAL      MEDICINE       [facility]  Next Visit: None Scheduled  None         None Found                                                            Last  Test          Frequency    Reason                     Performed    Due Date  --------------------------------------------------------------------------------    Lipid Panel.  12 months..  atorvastatin.............  10-   10-    Powered by [employer]. Reference number: [id number].   2/14/2022 2:53:16 PM CST   Spiritual

## 2023-01-11 ENCOUNTER — NON-APPOINTMENT (OUTPATIENT)
Age: 38
End: 2023-01-11

## 2023-01-11 PROBLEM — Z00.00 ENCOUNTER FOR PREVENTIVE HEALTH EXAMINATION: Status: ACTIVE | Noted: 2023-01-11

## 2023-03-21 ENCOUNTER — TRANSCRIPTION ENCOUNTER (OUTPATIENT)
Age: 38
End: 2023-03-21

## 2023-03-21 ENCOUNTER — APPOINTMENT (OUTPATIENT)
Dept: OBGYN | Facility: CLINIC | Age: 38
End: 2023-03-21
Payer: MEDICAID

## 2023-03-21 ENCOUNTER — NON-APPOINTMENT (OUTPATIENT)
Age: 38
End: 2023-03-21

## 2023-03-21 VITALS
DIASTOLIC BLOOD PRESSURE: 64 MMHG | WEIGHT: 293 LBS | SYSTOLIC BLOOD PRESSURE: 104 MMHG | HEIGHT: 67 IN | BODY MASS INDEX: 45.99 KG/M2

## 2023-03-21 DIAGNOSIS — Z83.3 FAMILY HISTORY OF DIABETES MELLITUS: ICD-10-CM

## 2023-03-21 DIAGNOSIS — Z01.419 ENCOUNTER FOR GYNECOLOGICAL EXAMINATION (GENERAL) (ROUTINE) W/OUT ABNORMAL FINDINGS: ICD-10-CM

## 2023-03-21 DIAGNOSIS — Z78.9 OTHER SPECIFIED HEALTH STATUS: ICD-10-CM

## 2023-03-21 DIAGNOSIS — E66.01 MORBID (SEVERE) OBESITY DUE TO EXCESS CALORIES: ICD-10-CM

## 2023-03-21 DIAGNOSIS — Z82.49 FAMILY HISTORY OF ISCHEMIC HEART DISEASE AND OTHER DISEASES OF THE CIRCULATORY SYSTEM: ICD-10-CM

## 2023-03-21 PROCEDURE — 99385 PREV VISIT NEW AGE 18-39: CPT

## 2023-03-21 NOTE — DISCUSSION/SUMMARY
[FreeTextEntry1] : - Pap/HPV obtained today\par - Contraceptive options reviewed; pt happy w/Liletta\par - STI testing offered; declined\par - Pt has referral to bariatric surgeon; encouraged consultation

## 2023-03-21 NOTE — PHYSICAL EXAM
[Appropriately responsive] : appropriately responsive [Alert] : alert [No Acute Distress] : no acute distress [Soft] : soft [Non-tender] : non-tender [Non-distended] : non-distended [No HSM] : No HSM [No Lesions] : no lesions [No Mass] : no mass [Oriented x3] : oriented x3 [Examination Of The Breasts] : a normal appearance [No Masses] : no breast masses were palpable [Labia Majora] : normal [Labia Minora] : normal [Normal] : normal [Uterine Adnexae] : normal [FreeTextEntry5] : IUD string not visualized

## 2023-03-21 NOTE — HISTORY OF PRESENT ILLNESS
[FreeTextEntry1] : 36yo nullip LMP 7/2021 (at tiime of Liletta IUD placement) here for annual exam.  No complaints.\par  [No] : Patient does not have concerns regarding sex [Currently Active] : currently active

## 2023-03-21 NOTE — COUNSELING
[Nutrition/ Exercise/ Weight Management] : nutrition, exercise, weight management [Vitamins/Supplements] : vitamins/supplements [Contraception/ Emergency Contraception/ Safe Sexual Practices] : contraception, emergency contraception, safe sexual practices [FreeTextEntry2] : Bariatric Surgery

## 2023-03-23 LAB
C TRACH RRNA SPEC QL NAA+PROBE: NOT DETECTED
HPV HIGH+LOW RISK DNA PNL CVX: NOT DETECTED
N GONORRHOEA RRNA SPEC QL NAA+PROBE: NOT DETECTED
SOURCE AMPLIFICATION: NORMAL
SOURCE TP AMPLIFICATION: NORMAL
T VAGINALIS RRNA SPEC QL NAA+PROBE: NOT DETECTED

## 2023-03-27 LAB — CYTOLOGY CVX/VAG DOC THIN PREP: NORMAL

## 2023-05-19 NOTE — DISCHARGE NOTE ADULT - ADMISSION DATE +STARTOFVISITDATE
What is cerebral amyloid angiopathy (CAA)?    Amyloid is a small protein that deposits in the very small blood vessels in the brain and can make them leaky.  Usually this occurs in the blood vessels near the surface of the brain (the cortex).  Blood can leak through the blood vessels and cause small bleeds (hemorrhages) that have no symptoms, or blood can leak through the blood vessels and cause big bleeds that do cause symptoms like headache, seizure, difficulty with balance, vision, talking, weakness, or numbness and tingling    What are the symptoms of CAA?  Patients often describe short “spells” with episodes of numbness and tingling, weakness, or speech difficulties that resolve within several minutes. While this is very similar to a transient ischemic attack (TIA), it is usually because of small bleeds that irritate the lining of the brain called the cortex and are actually more similar to seizures.    CAA is associated with thinking difficulties.  This can range from mild problems with memory, organization, language, and attention to dementia, and often progresses with time.    In patients with a large bleed, symptoms can include headache, seizure, difficulty with balance, vision, talking, weakness, or numbness and tingling.  If you experience these symptoms, you should call 911 and come to the emergency room immediately.    How do we diagnose CAA?  The only way to definitively diagnose CAA is through an autopsy (definite CAA).  Sometimes, doctors will take a piece of brain tissue (a biopsy) and look at it under the microscope and if we see the amyloid deposits, we call this probable CAA with supporting pathology.  Usually we try to avoid surgery and use MRI imaging to look for small bleeds (microhemorrhages), bleeding along the folds of the brain (superficial siderosis), and large bleeds (macrohemorrhages) to help diagnose CAA (probable or possible CAA).      How do we treat CAA?  There is no definitive  treatment for CAA.  We try to reduce the risk of bleeding by minimizing or eliminating medications that can cause bleeding.  These medications include:  -anticoagulants (warfarin/coumadin, apixaban/Eliquis, rivaroxaban/Xarelto, dabigatran/Pradaxa, lovenox)  -antiplatelets (aspirin, clopidogrel/Plavix, ticagrelor, Prasugrel)  -NSAIDs (advil, aleve, motrin, meloxicam, naproxen)  -statin medications  -omega 3     There may be some instances in which the risk of taking these medications outweighs the concern for bleeding in the brain, so talk to your doctor if you are prescribed any of these medications.    Other medications may be included to treat seizure or dementia.     -check your blood pressure daily, goal 100-130/60-80  -discussed that you should eat a healthy diet (Mediterranean)  -avoid smoking and second hand smoke  -alcohol should be no more than 2 drinks/day for men and 1 drink/day for women  -recommend moderate intensity exercise 10 minutes 4x per week or vigorous exercise 20 minutes 2x per week   -if you develop worst headache of life or any new stroke symptoms (BEFAST symptoms), call 911 immediately   -Follow up with your primary care provider  -return to clinic in XX months    *I will communicate results to you through the [employer] [facility].  If you do not have the [employer] de[facility], we will call you with results.    *You can also send me messages through the [employer] [facility], but it should NOT be used for any emergency situations, as it could take several days for me to review and return your messages.    Stroke, TIA and Warning Signs    Stroke occurs when a blood vessel bringing blood and oxygen to the brain gets blocked (ischemic stroke) or ruptures (hemorrhagic stroke). When this happens, brain cells don't get the blood that they need and the part of the body that the brain controls will not function appropriately.      Stroke symptoms (BEFAST):   BALANCE: Sudden loss of balance or coordination  EYES: Sudden  change in vision; loss of vision; blurry vision; or double vision  FACIAL DROOP: Any facial droop on one side of the face  ARMS: Arm or leg weakness, numbness, or tingling  SPEECH: Slurred speech, trouble speaking, trouble understanding speech  TERRIBLE HEADACHE: Sudden onset of a terrible headache    If you SUDDENLY develop any of these symptoms, call 9-1-1 or seek emergency medical attention immediately!  Even if the symptoms improve, this may be a transient blockage of the blood vessel, or TIA (transient ischemic attack) which may be a warning sign that a stroke (permanent brain damage) may occur.      Isn't stroke hopeless?    No! We can treat strokes with tPA (clot busting medication) and thrombectomy (a catheter that can remove the clot), but this can only be done if you come to the hospital quickly and there is brain tissue left to preserve.      Mediterranean diet  Key components of the Mediterranean diet  The Mediterranean diet emphasizes:  Eating primarily plant-based foods, such as fruits and vegetables, whole grains, legumes and nuts   Replacing butter with healthy fats, such as olive oil   Using herbs and spices instead of salt to flavor foods   Limiting red meat to no more than a few times a month   Eating fish and poultry at least twice a week   Drinking red wine in moderation (optional)  The diet also recognizes the importance of being physically active, and enjoying meals with family and friends.    Focus on fruits, vegetables, nuts and grains  The Mediterranean diet traditionally includes fruits, vegetables and grains. For example, residents of [facility] average six or more servings a day of antioxidant-rich fruits and vegetables.  Grains in the Mediterranean region are typically whole grain and usually contain very few unhealthy trans fats, and bread is an important part of the diet. However, throughout the Mediterranean region, bread is eaten plain or dipped in olive oil -- not eaten with butter or  margarine, which contains saturated or trans fats.   Nuts are another part of a healthy Mediterranean diet. Nuts are high in fat, but most of the fat is healthy. Because nuts are high in calories, they should not be eaten in large amounts -- generally no more than a handful a day. For the best nutrition, avoid candied or honey-roasted and heavily salted nuts.    Choose healthier fats  The focus of the Mediterranean diet isn't on limiting total fat consumption, but rather on choosing healthier types of fat. The Mediterranean diet discourages saturated fats and hydrogenated oils (trans fats), both of which contribute to heart disease.  The Mediterranean diet features olive oil as the primary source of fat. Olive oil is mainly monounsaturated fat -- a type of fat that can help reduce low-density lipoprotein (LDL) cholesterol levels when used in place of saturated or trans fats. \"Extra-virgin\" and \"virgin\" olive oils (the least processed forms) also contain the highest levels of protective plant compounds that provide antioxidant effects.  Canola oil and some nuts contain the beneficial linolenic acid (a type of omega-3 fatty acid) in addition to healthy unsaturated fat. Omega-3 fatty acids lower triglycerides, decrease blood clotting, and are associated with decreased incidence of sudden heart attacks, improve the health of your blood vessels, and help moderate blood pressure. Fatty fish -- such as mackerel, lake trout, herring, sardines, albacore tuna and salmon -- are rich sources of omega-3 fatty acids. Fish is eaten on a regular basis in the Mediterranean diet.    Putting it all togetherThe Mediterranean diet is a delicious and healthy way to eat. Many people who switch to this style of eating say they'll never eat any other way. Here are some specific steps to get you started:   Eat your veggies and fruits -- and switch to whole grains. [place] of plant foods should make up the majority of your meals. They should  be minimally processed -- fresh and whole are best. Include veggies and fruits in every meal and eat them for snacks as well. Switch to whole-grain bread and cereal, and begin to eat more whole-grain rice and pasta products. Keep baby carrots, apples and bananas on hand for quick, satisfying snacks. Fruit salads are a wonderful way to eat a variety of healthy fruit.   Go nuts. Nuts and seeds are good sources of fiber, protein and healthy fats. Keep almonds, cashews, pistachios and walnuts on hand for a quick snack. Choose natural peanut butter, rather than the kind with hydrogenated fat added. Try blended sesame seeds (tahini) as a dip or spread for bread.   Pass on the butter. Try olive or canola oil as a healthy replacement for butter or margarine. Lightly drizzle it over vegetables. After cooking pasta, add a touch of olive oil, some garlic and green onions for flavoring. Dip bread in flavored olive oil or lightly spread it on whole-grain bread for a tasty alternative to butter. Try tahini as a dip or spread for bread too.   Spice it up. Herbs and spices make food tasty and can  for salt and fat in recipes.   Go fish. Eat fish at least twice a week. Fresh or water-packed tuna, salmon, trout, mackerel and herring are healthy choices. [place], bake or broil fish for great taste and easy cleanup. Avoid breaded and fried fish.   Rein in the red meat. Limit red meat to no more than a few times a month. Substitute fish and poultry for red meat. When choosing red meat, make sure it's lean and keep portions small (about the size of a deck of cards). Also avoid sausage, staley and other high-fat, processed meats.   Choose low-fat dairy. Limit higher fat dairy products, such as whole or 2 percent milk, cheese and ice cream. Switch to skim milk, fat-free yogurt and low-fat cheese.    Statement Selected

## 2025-03-26 NOTE — H&P ADULT - PROBLEM SELECTOR PLAN 2
Attempted to notify pt, left vm.  
Patient notified above, understood with no further questions or concerns.  
cont metformin